# Patient Record
Sex: MALE | Race: AMERICAN INDIAN OR ALASKA NATIVE
[De-identification: names, ages, dates, MRNs, and addresses within clinical notes are randomized per-mention and may not be internally consistent; named-entity substitution may affect disease eponyms.]

---

## 2018-05-17 ENCOUNTER — HOSPITAL ENCOUNTER (EMERGENCY)
Dept: HOSPITAL 5 - ED | Age: 53
Discharge: HOME | End: 2018-05-17
Payer: COMMERCIAL

## 2018-05-17 VITALS — SYSTOLIC BLOOD PRESSURE: 159 MMHG | DIASTOLIC BLOOD PRESSURE: 108 MMHG

## 2018-05-17 DIAGNOSIS — I10: ICD-10-CM

## 2018-05-17 DIAGNOSIS — R39.15: Primary | ICD-10-CM

## 2018-05-17 LAB
BILIRUB UR QL STRIP: (no result)
BLOOD UR QL VISUAL: (no result)
MUCOUS THREADS #/AREA URNS HPF: (no result) /HPF
PH UR STRIP: 6 [PH] (ref 5–7)
PROT UR STRIP-MCNC: (no result) MG/DL
RBC #/AREA URNS HPF: 2 /HPF (ref 0–6)
UROBILINOGEN UR-MCNC: < 2 MG/DL (ref ?–2)
WBC #/AREA URNS HPF: 1 /HPF (ref 0–6)

## 2018-05-17 PROCEDURE — 99283 EMERGENCY DEPT VISIT LOW MDM: CPT

## 2018-05-17 PROCEDURE — 81001 URINALYSIS AUTO W/SCOPE: CPT

## 2018-05-17 NOTE — EMERGENCY DEPARTMENT REPORT
HPI





- General


Chief Complaint: Abdominal Pain


Time Seen by Provider: 05/17/18 11:55





- HPI


HPI: 


This is a 53-year-old male with a history of hypertension controlled with 

medication who presents to ED complaining of urinary symptoms.  Patient states 

this morning when he went to urinate he thought his so in the toilet and does 

not knows that came out of his penis or was started in the toilet.  Patient 

states he was concerned it certainly came in to be evaluated.  Patient denies 

burning or painful urination or seen blood in the urine.  Patient denies penile 

discharge, scrotal swelling in scrotum pain, abnormal bowel movement.  Patient 

states sometimes it gets or urinary urgency but otherwise no other symptoms.


Patient states that he has not been taking his BP medication as regularly as 

issues because it makes him urinate a lot.  He denies fevers/nausea/vomiting/

abdominal pelvic pain/chest pain or shortness of breath or headache








ED Past Medical Hx





- Past Medical History


Previous Medical History?: Yes


Hx Hypertension: Yes





- Surgical History


Past Surgical History?: No





- Social History


Smoking Status: Never Smoker


Substance Use Type: Alcohol





- Medications


Home Medications: 


 Home Medications











 Medication  Instructions  Recorded  Confirmed  Last Taken  Type


 


Hydrocodone Bit/Acetaminophen 1 each PO Q8H PRN #14 tablet 12/02/13  Unknown Rx





[Lortab 7.5-500 mg]     


 


Ibuprofen [Motrin] 800 mg PO TID PRN #25 tablet 12/02/13  Unknown Rx














ED Review of Systems


ROS: 


Stated complaint: C/O PARASITE/TAPEWORM


Other details as noted in HPI





Constitutional: denies: chills, fever


Eyes: denies: eye pain, eye discharge, vision change


ENT: denies: ear pain, throat pain


Respiratory: denies: cough, shortness of breath, wheezing


Cardiovascular: denies: chest pain, palpitations


Endocrine: no symptoms reported


Gastrointestinal: denies: abdominal pain, nausea, diarrhea


Genitourinary: urgency.  denies: dysuria, frequency, hematuria, discharge, 

testicular pain, testicular mass


Musculoskeletal: denies: back pain, joint swelling, arthralgia


Skin: denies: rash, lesions


Neurological: denies: headache, weakness, paresthesias


Psychiatric: denies: anxiety, depression


Hematological/Lymphatic: denies: easy bleeding, easy bruising





Physical Exam





- Physical Exam


Vital Signs: 


 Vital Signs











  05/17/18 05/17/18





  10:21 11:56


 


Temperature 98.5 F 


 


Pulse Rate 94 H 


 


Respiratory 16 18





Rate  


 


Blood Pressure 159/108 


 


O2 Sat by Pulse 98 





Oximetry  











Physical Exam: 





GENERAL: Alert and oriented x3, no apparent distress, Normal Gait, atraumatic.


HEAD: Head is normocephalic and a-traumatic.


EYES: Extra ocular muscles are intact. Pupils are equal, round, and reactive to 

light and accommodation.





LUNGS: Symetrical with respiration, No wheezing, no rales or crackles, CTAB.


HEART:  S1, S2 present, regular rate and rhythm without murmur, no rubs, no 

gallops. Non tender to palpation


ABDOMEN: No organomegaly was noted,Positive bowel sounds, soft, and non-

distended.  . Nontender to palpation on all Quadrants, NO CVA tenderness.


BACK: Full range of motion, no spinal tenderness, nontender to palpation.





NEUROLOGIC:  The patient is cooperative with no focal neurologic deficits. 


SKIN:  Warm and dry, No lesions, No ulceration or induration present.











ED Course


 Vital Signs











  05/17/18 05/17/18





  10:21 11:56


 


Temperature 98.5 F 


 


Pulse Rate 94 H 


 


Respiratory 16 18





Rate  


 


Blood Pressure 159/108 


 


O2 Sat by Pulse 98 





Oximetry  














ED Medical Decision Making





- Medical Decision Making


53-year-old male presents with urinary urgency and has a blood pressure


ED course: Urinalysis shows no abnormalities


Discussed the patient to go home and take his blood pressure medication as 

prescribed.


Discussed with patient follow-up with his primary care physician


Patient states understanding that he will go home and take his medication.  

Patient states he does have an appointment with his primary care on [address] and 

will keep that appointment.  I also discussed the care and make an appointment 

to be seen sooner.


Vital signs are normal patient is in no acute or respiratory distress.


Patient was asymptomatic in the ED.


Critical care attestation.: 


If time is entered above; I have spent that time in minutes in the direct care 

of this critically ill patient, excluding procedure time.








ED Disposition


Clinical Impression: 


 Worried well





Hypertension


Qualifiers:


 Hypertension type: unspecified Qualified Code(s): I10 - Essential (primary) 

hypertension





Disposition: DC-01 TO HOME OR SELFCARE


Is pt being admited?: No


Does the pt Need Aspirin: No


Condition: Stable


Instructions:  Chronic Hypertension (ED), Hypertension (ED)


Additional Instructions: 


Make sure to follow up with the primary care physician as discussed.


Take all your medications as you've been prescribed.


If you have any worsening symptoms or develop new symptoms please return to ED 

immediately.


Referrals: 


OTIS MADERA MD [Primary Care Provider] - 3-5 Days


Forms:  Work/School Release Form(ED)


Time of Disposition: 13:40